# Patient Record
Sex: FEMALE | Race: BLACK OR AFRICAN AMERICAN | NOT HISPANIC OR LATINO | URBAN - METROPOLITAN AREA
[De-identification: names, ages, dates, MRNs, and addresses within clinical notes are randomized per-mention and may not be internally consistent; named-entity substitution may affect disease eponyms.]

---

## 2024-03-28 ENCOUNTER — OV NP (OUTPATIENT)
Dept: URBAN - METROPOLITAN AREA CLINIC 92 | Facility: CLINIC | Age: 69
End: 2024-03-28

## 2024-03-28 VITALS
SYSTOLIC BLOOD PRESSURE: 225 MMHG | HEIGHT: 60 IN | BODY MASS INDEX: 39.74 KG/M2 | DIASTOLIC BLOOD PRESSURE: 108 MMHG | HEART RATE: 66 BPM | WEIGHT: 202.4 LBS | TEMPERATURE: 97.3 F

## 2024-03-28 PROBLEM — 38341003: Status: ACTIVE | Noted: 2024-03-28

## 2024-03-28 PROBLEM — 307496006: Status: ACTIVE | Noted: 2024-03-28

## 2024-03-28 RX ORDER — LEVOTHYROXINE SODIUM 150 UG/1
TABLET ORAL
Qty: 90 TABLET | Status: ACTIVE | COMMUNITY

## 2024-03-28 RX ORDER — POLYETHYLENE GLYCOL-3350 AND ELECTROLYTES WITH FLAVOR PACK 240; 5.84; 2.98; 6.72; 22.72 G/278.26G; G/278.26G; G/278.26G; G/278.26G; G/278.26G
AS DIRECTED POWDER, FOR SOLUTION ORAL
OUTPATIENT
Start: 2024-03-28

## 2024-03-28 RX ORDER — HYOSCYAMINE SULFATE 0.125 MG
1 TABLET ON THE TONGUE AND ALLOW TO DISSOLVE  AS NEEDED TABLET,DISINTEGRATING ORAL
OUTPATIENT
Start: 2024-03-28

## 2024-03-28 RX ORDER — ONDANSETRON 8 MG/1
1 TABLET ON THE TONGUE AND ALLOW TO DISSOLVE  AS NEEDED TABLET, ORALLY DISINTEGRATING ORAL ONCE A DAY
Qty: 30 | OUTPATIENT
Start: 2024-03-28

## 2024-03-28 NOTE — HPI-TODAY'S VISIT:
Patient is a 68 year old female who presents for nausea and stomach discomfort.  Patient went to [facility] on 3/17/24 for diffuse abdominal pain. Diagnosed with diverticulosis. Discharged with augment . PAtient notes she finsihed her abx. [place] three rties hendrickson it presents her pain. denies vomiting, reflx, or dysphagia. Diarrhea in the mornings lately Denies diarrhea, hematochezia or melena. Never had this pain before. Night sweats are normal for her. Denies fever or chills.  BM twice daily. Denies alcohol use. 7-8 cigarettes daily. Denies illcit drug use.   Last colonoscopy about 5 years ago polyps per patient. Denies fmhx of GI cancers.

## 2024-05-07 ENCOUNTER — OFFICE VISIT (OUTPATIENT)
Dept: URBAN - METROPOLITAN AREA SURGERY CENTER 16 | Facility: SURGERY CENTER | Age: 69
End: 2024-05-07

## 2024-05-30 ENCOUNTER — OFFICE VISIT (OUTPATIENT)
Dept: URBAN - METROPOLITAN AREA CLINIC 92 | Facility: CLINIC | Age: 69
End: 2024-05-30